# Patient Record
Sex: FEMALE | Race: BLACK OR AFRICAN AMERICAN | NOT HISPANIC OR LATINO | URBAN - METROPOLITAN AREA
[De-identification: names, ages, dates, MRNs, and addresses within clinical notes are randomized per-mention and may not be internally consistent; named-entity substitution may affect disease eponyms.]

---

## 2022-10-31 ENCOUNTER — EMERGENCY (EMERGENCY)
Facility: HOSPITAL | Age: 32
LOS: 1 days | Discharge: HOME | End: 2022-10-31
Attending: EMERGENCY MEDICINE
Payer: SELF-PAY

## 2022-10-31 VITALS
WEIGHT: 293 LBS | DIASTOLIC BLOOD PRESSURE: 91 MMHG | OXYGEN SATURATION: 100 % | HEART RATE: 75 BPM | RESPIRATION RATE: 18 BRPM | SYSTOLIC BLOOD PRESSURE: 152 MMHG | TEMPERATURE: 98 F

## 2022-10-31 PROCEDURE — 99282 EMERGENCY DEPT VISIT SF MDM: CPT

## 2022-10-31 PROCEDURE — 99283 EMERGENCY DEPT VISIT LOW MDM: CPT

## 2022-10-31 RX ORDER — ACETAMINOPHEN 500 MG
650 TABLET ORAL ONCE
Refills: 0 | Status: COMPLETED | OUTPATIENT
Start: 2022-10-31 | End: 2022-10-31

## 2022-10-31 RX ADMIN — Medication 650 MILLIGRAM(S): at 22:50

## 2022-11-01 NOTE — ED PROVIDER NOTE - PATIENT PORTAL LINK FT
You can access the FollowMyHealth Patient Portal offered by [facility] by registering at the following website: [email or web address]. By joining [employer]’s FollowMyHealth portal, you will also be able to view your health information using other applications (apps) compatible with our system.

## 2022-11-01 NOTE — ED PROVIDER NOTE - PHYSICAL EXAMINATION
Mild soft tissue swelling to right forehead area  GCS 15, no raccoon eyes, no Battles sign, no scalp step off deformities.   No cervical, thoracic or lumbosacral midline bony deformities,  +rotation and flexion-extension of neck and truncal area intact.

## 2022-11-01 NOTE — ED PROVIDER NOTE - CLINICAL SUMMARY MEDICAL DECISION MAKING FREE TEXT BOX
12:35a- Pt is well appearing, no distress, normal gait in ED. 12:35a- Pt is well appearing, no distress, normal gait in ED. Cold compress provided for pt.  Pt is well appearing, has no new complaints and able to walk with normal gait. Pt is stable for discharge and follow up with medical doctor. Pt educated on care and need for follow up. Discussed anticipatory guidance and return precautions. Questions answered. I had a detailed discussion with the patient and/or guardian regarding the historical points, exam findings, and any diagnostic results supporting the discharge diagnosis.

## 2022-11-01 NOTE — ED PROVIDER NOTE - NSFOLLOWUPINSTRUCTIONS_ED_ALL_ED_FT
There are many types of head injuries. Head injuries can be as minor as a small bump, or they can be a serious medical issue. More severe head injuries include:  •A jarring injury to the brain (concussion).      •A bruise (contusion) of the brain. This means there is bleeding in the brain that can cause swelling.      •A cracked skull (skull fracture).      •Bleeding in the brain that collects, clots, and forms a bump (hematoma).      After a head injury, most problems occur within the first 24 hours, but side effects may occur up to 7–10 days after the injury. It is important to watch your condition for any changes. You may need to be observed in the emergency department or urgent care, or you may be admitted to the hospital.      What are the causes?    There are many possible causes of a head injury. Serious head injuries may be caused by car accidents, bicycle or motorcycle accidents, sports injuries, falls, or being struck by an object.      What are the symptoms?    Symptoms of a head injury include a contusion, bump, or bleeding at the site of the injury. Other physical symptoms may include:  •Headache.      •Nausea or vomiting.      •Dizziness.      •Blurred or double vision.      •Being uncomfortable around bright lights or loud noises.      •Seizures.      •Feeling tired.      •Trouble being awakened.      •Loss of consciousness.      Mental or emotional symptoms may include:  •Irritability.      •Confusion and memory problems.      •Poor attention and concentration.      •Changes in eating or sleeping habits.      •Anxiety or depression.        How is this diagnosed?    This condition can usually be diagnosed based on your symptoms, a description of the injury, and a physical exam. You may also have imaging tests done, such as a CT scan or an MRI.      How is this treated?    Treatment for this condition depends on the severity and type of injury you have. The main goal of treatment is to prevent complications and allow the brain time to heal.    Mild head injury     If you have a mild head injury, you may be sent home, and treatment may include:  •Observation. A responsible adult should stay with you for 24 hours after your injury and check on you often.      •Physical rest.      •Brain rest.      •Pain medicines.      Severe head injury    If you have a severe head injury, treatment may include:•Close observation. This includes hospitalization with the following care:  •Frequent physical exams.      •Frequent checks of how your brain and nervous system are working (neurological status).      •Checking your blood pressure and oxygen levels.        •Medicines to relieve pain, prevent seizures, and decrease brain swelling.      •Airway protection and breathing support. This may include using a ventilator.      •Treatments that monitor and manage swelling inside the brain.    •Brain surgery. This may be needed to:  •Remove a collection of blood or blood clots.      •Stop the bleeding.      •Remove a part of the skull to allow room for the brain to swell.          Follow these instructions at home:    Activity     •Rest and avoid activities that are physically hard or tiring.      •Make sure you get enough sleep.    •Let your brain rest by limiting activities that require a lot of thought or attention, such as:  •Watching TV.      •Playing memory games and puzzles.      •Job-related work or homework.      •Working on the computer, using social media, and texting.        •Avoid activities that could cause another head injury, such as playing sports, until your health care provider approves. Having another head injury, especially before the first one has healed, can be dangerous.      •Ask your health care provider when it is safe for you to return to your regular activities, including work or school. Ask your health care provider for a step-by-step plan for gradually returning to activities.      •Ask your health care provider when you can drive, ride a bicycle, or use heavy machinery. Your ability to react may be slower after a brain injury. Do not do these activities if you are dizzy.        Lifestyle      • Do not drink alcohol until your health care provider approves. Do not use drugs. Alcohol and certain drugs may slow your recovery and can put you at risk of further injury.      •If it is harder than usual to remember things, write them down.      •If you are easily distracted, try to do one thing at a time.      •Talk with family members or close friends when making important decisions.      •Tell your friends, family, a trusted colleague, and  about your injury, symptoms, and restrictions. Have them watch for any new or worsening problems.      General instructions     •Take over-the-counter and prescription medicines only as told by your health care provider.      •Have someone stay with you for 24 hours after your head injury. This person should watch you for any changes in your symptoms and be ready to seek medical help.      •Keep all follow-up visits as told by your health care provider. This is important.        How is this prevented?    •Work on improving your balance and strength to avoid falls.      •Wear a seat belt when you are in a moving vehicle.      •Wear a helmet when riding a bicycle, skiing, or doing any other sport or activity that has a risk of injury.    •If you drink alcohol:•Limit how much you use to:  •0–1 drink a day for nonpregnant women.      •0–2 drinks a day for men.        •Be aware of how much alcohol is in your drink. In the [place] one drink equals one 12 oz bottle of beer (355 mL), one 5 oz glass of wine (148 mL), or one 1½ oz glass of hard liquor (44 mL).      •Take safety measures in your home, such as:  •Removing clutter and tripping hazards from floors and stairways.      •Using grab bars in bathrooms and handrails by stairs.      •Placing non-slip mats on floors and in bathtubs.      •Improving lighting in dim areas.          Where to find more information    •Centers for Disease Control and Prevention: www.cdc.gov        Get help right away if:  •You have:  •A severe headache that is not helped by medicine.      •Trouble walking or weakness in your arms and legs.      •Clear or bloody fluid coming from your nose or ears.      •Changes in your vision.      •A seizure.      •Increased confusion or irritability.        •Your symptoms get worse.      •You are sleepier than normal and have trouble staying awake.      •You lose your balance.      •Your pupils change size.      •Your speech is slurred.      •Your dizziness gets worse.      •You vomit.      These symptoms may represent a serious problem that is an emergency. Do not wait to see if the symptoms will go away. Get medical help right away. Call your local emergency services (911 in the [place] Do not drive yourself to the hospital.       Summary    •Head injuries can be minor, or they can be a serious medical issue requiring immediate attention.      •Treatment for this condition depends on the severity and type of injury you have.      •Have someone stay with you for 24 hours after your injury and check on you often.      •Ask your health care provider when it is safe for you to return to your regular activities, including work or school.      •Head injury prevention includes wearing a seat belt in a motor vehicle, using a helmet on a bicycle, limiting alcohol use, and taking safety measures in your home.      This information is not intended to replace advice given to you by your health care provider. Make sure you discuss any questions you have with your health care provider.

## 2022-11-01 NOTE — ED PROVIDER NOTE - OBJECTIVE STATEMENT
32-year-old female patient states 1 hour prior to arrival she was in bathroom and turned around and accidentally struck right side of forehead against open door.  No LOC reported, no neck pain, no nausea no vomiting.  On evaluation patient is well-appearing and has no signs of traumatic brain injury.

## 2022-11-01 NOTE — ED PROVIDER NOTE - NSFOLLOWUPCLINICS_GEN_ALL_ED_FT
[facility]  Internal Medicine  [address] [place] [address]  Phone: [phone]  Fax: [phone]

## 2022-12-16 NOTE — ED ADULT TRIAGE NOTE - WEIGHT IN LBS
Condition:: Subcutaneous nodule located on the back Please Describe Your Condition:: Pediatrician diagnosed otc with subcutaneous nodule. Nodule has been onset for 14 months. No medication or rx were applied on the area. 045

## 2023-01-21 ENCOUNTER — EMERGENCY (EMERGENCY)
Facility: HOSPITAL | Age: 33
LOS: 1 days | Discharge: HOME | End: 2023-01-21
Attending: EMERGENCY MEDICINE
Payer: SELF-PAY

## 2023-01-21 VITALS
SYSTOLIC BLOOD PRESSURE: 132 MMHG | TEMPERATURE: 98 F | RESPIRATION RATE: 18 BRPM | HEIGHT: 62 IN | WEIGHT: 287.04 LBS | HEART RATE: 76 BPM | DIASTOLIC BLOOD PRESSURE: 94 MMHG | OXYGEN SATURATION: 99 %

## 2023-01-21 PROCEDURE — 99284 EMERGENCY DEPT VISIT MOD MDM: CPT

## 2023-01-21 PROCEDURE — 99053 MED SERV 10PM-8AM 24 HR FAC: CPT

## 2023-01-21 RX ORDER — DIPHENHYDRAMINE HCL 50 MG
50 CAPSULE ORAL ONCE
Refills: 0 | Status: COMPLETED | OUTPATIENT
Start: 2023-01-21 | End: 2023-01-21

## 2023-01-21 NOTE — ED PROVIDER NOTE - OBJECTIVE STATEMENT
32-year-old woman presenting complaining of feeling generalized skin itching and puffiness beginning earlier this evening.  She reports that she thinks it stemming from eating a can of tuna yesterday for lunch but symptoms did not present until earlier this evening.  Otherwise she does not have any new exposures that she is aware of.  She did not take anything at home for an allergic reaction prior to coming to the ER.  She denies any facial swelling, lip swelling, tongue swelling, difficulty breathing, chest pain, nausea or vomiting.

## 2023-01-21 NOTE — ED PROVIDER NOTE - NSFOLLOWUPINSTRUCTIONS_ED_ALL_ED_FT
Thank you for choosing [facility] for your health care.    You were seen in the ER for possible allergic reaction.  There was no evidence of a severe or life-threatening reaction at this time.  You were treated with Benadryl for the itchiness which improved your symptoms.  You can continue taking Benadryl over-the-counter if the symptoms recur.  Please return to the emergency room if you feel like your throat is closing, if you cannot breathe or for any other concerning symptoms.

## 2023-01-21 NOTE — ED ADULT TRIAGE NOTE - CCCP TRG CHIEF CMPLNT
c/o allergic reaction after eating tuna yesterday at 1 pm /itchy skin /swollen face/allergic reaction

## 2023-01-21 NOTE — ED PROVIDER NOTE - PHYSICAL EXAMINATION
Exam:  General: Patient well appearing, vital signs within normal limits  HEENT: airway patent with moist mucous membranes, no uvular edema, no lip or tongue edema  Cardiac: RRR S1/S2 with strong peripheral pulses  Respiratory: lungs clear without respiratory distress  GI: abdomen soft, non tender, non distended  Neuro: no gross neurologic deficits  Skin: warm, well perfused, no urticaria  Psych: normal mood and affect

## 2023-01-21 NOTE — ED PROVIDER NOTE - PATIENT PORTAL LINK FT
You can access the FollowMyHealth Patient Portal offered by [facility] by registering at the following website: [email or web address]. By joining [employer]’s FollowMyHealth portal, you will also be able to view your health information using other applications (apps) compatible with our system.

## 2023-01-21 NOTE — ED PROVIDER NOTE - CLINICAL SUMMARY MEDICAL DECISION MAKING FREE TEXT BOX
Patient presenting with nonspecific pruritus without evidence of anaphylaxis or severe allergic reaction.  Will treat with Benadryl and reevaluate.  Anticipate patient can be discharged home unless significant progression of symptoms while in the ER.

## 2023-01-22 PROBLEM — Z78.9 OTHER SPECIFIED HEALTH STATUS: Chronic | Status: ACTIVE | Noted: 2022-11-01

## 2023-01-22 PROCEDURE — 99282 EMERGENCY DEPT VISIT SF MDM: CPT

## 2023-01-22 RX ADMIN — Medication 50 MILLIGRAM(S): at 00:29

## 2023-01-22 NOTE — ED ADULT NURSE NOTE - OBJECTIVE STATEMENT
Patient came to ED with c/o itchiness and facial swollen after she ate a can of tuna. Patient denies shortness/difficulty of breathing.

## 2023-02-10 ENCOUNTER — EMERGENCY (EMERGENCY)
Facility: HOSPITAL | Age: 33
LOS: 1 days | Discharge: HOME | End: 2023-02-10
Attending: EMERGENCY MEDICINE
Payer: SELF-PAY

## 2023-02-10 VITALS
HEART RATE: 100 BPM | HEIGHT: 62 IN | TEMPERATURE: 98 F | RESPIRATION RATE: 16 BRPM | DIASTOLIC BLOOD PRESSURE: 102 MMHG | WEIGHT: 287.92 LBS | OXYGEN SATURATION: 96 % | SYSTOLIC BLOOD PRESSURE: 150 MMHG

## 2023-02-10 VITALS
RESPIRATION RATE: 16 BRPM | HEART RATE: 74 BPM | OXYGEN SATURATION: 100 % | DIASTOLIC BLOOD PRESSURE: 89 MMHG | SYSTOLIC BLOOD PRESSURE: 129 MMHG | TEMPERATURE: 98 F

## 2023-02-10 PROCEDURE — 99283 EMERGENCY DEPT VISIT LOW MDM: CPT

## 2023-02-10 PROCEDURE — 99284 EMERGENCY DEPT VISIT MOD MDM: CPT

## 2023-02-10 PROCEDURE — 99053 MED SERV 10PM-8AM 24 HR FAC: CPT

## 2023-02-10 RX ORDER — FAMOTIDINE 10 MG/ML
40 INJECTION INTRAVENOUS ONCE
Refills: 0 | Status: COMPLETED | OUTPATIENT
Start: 2023-02-10 | End: 2023-02-10

## 2023-02-10 RX ORDER — DIPHENHYDRAMINE HCL 50 MG
25 CAPSULE ORAL ONCE
Refills: 0 | Status: COMPLETED | OUTPATIENT
Start: 2023-02-10 | End: 2023-02-10

## 2023-02-10 RX ADMIN — Medication 40 MILLIGRAM(S): at 22:59

## 2023-02-10 RX ADMIN — Medication 25 MILLIGRAM(S): at 22:59

## 2023-02-10 RX ADMIN — FAMOTIDINE 40 MILLIGRAM(S): 10 INJECTION INTRAVENOUS at 22:58

## 2023-02-10 NOTE — ED PROVIDER NOTE - PROGRESS NOTE DETAILS
feels improved. rash resolved. will dc. rx for prednisone. f/u with allergist. return precautions discussed.

## 2023-02-10 NOTE — ED PROVIDER NOTE - PATIENT PORTAL LINK FT
You can access the FollowMyHealth Patient Portal offered by [facility] by registering at the following website: [email or web address]. By joining [email or web address]’s FollowMyHealth portal, you will also be able to view your health information using other applications (apps) compatible with our system.

## 2023-02-10 NOTE — ED PROVIDER NOTE - NSFOLLOWUPINSTRUCTIONS_ED_ALL_ED_FT
[facility]al[facility]                                                                                                                                                                 Allergies, Adult      An allergy is a condition in which the body's defense system (immune system) comes in contact with an allergen and reacts to it. An allergen is anything that causes an allergic reaction. Allergens cause the immune system to make proteins for fighting infections (antibodies). These antibodies cause cells to release chemicals called histamines that set off the symptoms of an allergic reaction.    Allergies often affect the nasal passages (allergic rhinitis), eyes (allergic conjunctivitis), skin (atopic dermatitis), and stomach. Allergies can be mild, moderate, or severe. They cannot spread from person to person. Allergies can develop at any age and may be outgrown.      What are the causes?    This condition is caused by allergens. Common allergens include:  •Outdoor allergens, such as pollen, car fumes, and mold.      •Indoor allergens, such as dust, smoke, mold, and pet dander.      •Other allergens, such as foods, medicines, scents, insect bites or stings, and other skin irritants.        What increases the risk?    You are more likely to develop this condition if you have:  •Family members with allergies.      •Family members who have any condition that may be caused by allergens, such as asthma. This may make you more likely to have other allergies.        What are the signs or symptoms?    Symptoms of this condition depend on the severity of the allergy.    Mild to moderate symptoms     •Runny nose, stuffy nose (nasal congestion), or sneezing.      •Itchy mouth, ears, or throat.      •A feeling of mucus dripping down the back of your throat (postnasal drip).      •Sore throat.      •Itchy, red, watery, or puffy eyes.      •Skin rash, or itchy, red, swollen areas of skin (hives).      •Stomach cramps or bloating.      Severe symptoms     Severe allergies to food, medicine, or insect bites may cause anaphylaxis, which can be life-threatening. Symptoms include:  •A red (flushed) face.      •Wheezing or coughing.      •Swollen lips, tongue, or mouth.      •Tight or swollen throat.      •Chest pain or tightness, or rapid heartbeat.      •Trouble breathing or shortness of breath.      •Pain in the abdomen, vomiting, or diarrhea.      •Dizziness or fainting.        How is this diagnosed?    This condition is diagnosed based on your symptoms, your family and medical history, and a physical exam. You may also have tests, including:•Skin tests to see how your skin reacts to allergens that may be causing your symptoms. Tests include:  •Skin prick test. For this test, an allergen is introduced to your body through a small opening in the skin.      •Intradermal skin test. For this test, a small amount of allergen is injected under the first layer of your skin.      •Patch test. For this test, a small amount of allergen is placed on your skin. The area is covered and then checked after a few days.        •Blood tests.      •A challenge test. For this test, you will eat or breathe in a small amount of allergen to see if you have an allergic reaction.      You may also be asked to:  •Keep a food diary. This is a record of all the foods, drinks, and symptoms you have in a day.    •Try an elimination diet. To do this:  •Remove certain foods from your diet.      •Add those foods back one by one to find out if any foods cause an allergic reaction.          How is this treated?                  Treatment for allergies depends on your symptoms. Treatment may include:  •Cold, wet cloths (cold compresses) to soothe itching and swelling.      •Eye drops or nasal sprays.      •Nasal irrigation to help clear your mucus or keep the nasal passages moist.      •A humidifier to add moisture to the air.      •Skin creams to treat rashes or itching.      •Oral antihistamines or other medicines to block the reaction or to treat inflammation.      •Diet changes to remove foods that cause allergies.    •Being exposed again and again to tiny amounts of allergens to help you build a defense against it (tolerance). This is called immunotherapy. Examples include:  •Allergy shot. You receive an injection that contains an allergen.      •Sublingual immunotherapy. You take a small dose of allergen under your tongue.        •Emergency injection for anaphylaxis. You give yourself a shot using a syringe (auto-injector) that contains the amount of medicine you need. Your health care provider will teach you how to give yourself an injection.        Follow these instructions at home:      Medicines      •Take or apply over-the-counter and prescription medicines only as told by your health care provider.      •Always carry your auto-injector pen if you are at risk of anaphylaxis. Give yourself an injection as told by your health care provider.      Eating and drinking     •Follow instructions from your health care provider about eating or drinking restrictions.      •Drink enough fluid to keep your urine pale yellow.      General instructions     •Wear a medical alert bracelet or necklace to let others know that you have had anaphylaxis before.      •Avoid known allergens whenever possible.      •Keep all follow-up visits as told by your health care provider. This is important.        Contact a health care provider if:    •Your symptoms do not get better with treatment.        Get help right away if:  •You have symptoms of anaphylaxis. These include:  •Swollen mouth, tongue, or throat.      •Pain or tightness in your chest.      •Trouble breathing or shortness of breath.      •Dizziness or fainting.      •Severe abdominal pain, vomiting, or diarrhea.        These symptoms may represent a serious problem that is an emergency. Do not wait to see if the symptoms will go away. Get medical help right away. Call your local emergency services (911 in the [place] Do not drive yourself to the hospital.       Summary    •Take or apply over-the-counter and prescription medicines only as told by your health care provider.      •Avoid known allergens when possible.      •Always carry your auto-injector pen if you are at risk of anaphylaxis. Give yourself an injection as told by your health care provider.      •Wear a medical alert bracelet or necklace to let others know that you have had anaphylaxis before.      •Anaphylaxis is a life-threatening emergency. Get help right away.      This information is not intended to replace advice given to you by your health care provider. Make sure you discuss any questions you have with your health care provider.      Document Revised: 08/16/2021 Document Reviewed: 10/28/2020    Elsevier Patient Education © 2022 [employer]

## 2023-02-10 NOTE — ED PROVIDER NOTE - OBJECTIVE STATEMENT
32 year old female denies PMH coming in with an allergic reaction. states started to have generalized hives. had taken benadryl throughout the day but still with hives. states 3 weeks ago had a similar but less intense reaction and thought it was from tuna in oil but today hasn't had anything out of the ordinary. denies facial swelling, difficulty breathing, and all other complaints.

## 2023-02-10 NOTE — ED PROVIDER NOTE - NSFOLLOWUPCLINICS_GEN_ALL_ED_FT
[facility]  Allergy  [address] [place] [address]  Phone: [phone]  Fax:

## 2023-02-10 NOTE — ED PROVIDER NOTE - PHYSICAL EXAMINATION
urticaria around b/l UE/LE/trunk  no facial swelling. speaking in full sentences. tolerating secretions without issue.

## 2023-02-11 ENCOUNTER — EMERGENCY (EMERGENCY)
Facility: HOSPITAL | Age: 33
LOS: 1 days | Discharge: HOME | End: 2023-02-11
Attending: EMERGENCY MEDICINE
Payer: SELF-PAY

## 2023-02-11 VITALS
HEART RATE: 78 BPM | DIASTOLIC BLOOD PRESSURE: 76 MMHG | SYSTOLIC BLOOD PRESSURE: 147 MMHG | RESPIRATION RATE: 18 BRPM | OXYGEN SATURATION: 100 % | TEMPERATURE: 98 F

## 2023-02-11 VITALS
RESPIRATION RATE: 16 BRPM | HEART RATE: 87 BPM | HEIGHT: 62 IN | OXYGEN SATURATION: 100 % | TEMPERATURE: 98 F | SYSTOLIC BLOOD PRESSURE: 153 MMHG | DIASTOLIC BLOOD PRESSURE: 78 MMHG | WEIGHT: 287.92 LBS

## 2023-02-11 LAB
ALBUMIN SERPL ELPH-MCNC: 3.6 G/DL — SIGNIFICANT CHANGE UP (ref 3.5–5)
ALP SERPL-CCNC: 90 U/L — SIGNIFICANT CHANGE UP (ref 40–120)
ALT FLD-CCNC: 30 U/L DA — SIGNIFICANT CHANGE UP (ref 10–60)
ANION GAP SERPL CALC-SCNC: 5 MMOL/L — SIGNIFICANT CHANGE UP (ref 5–17)
AST SERPL-CCNC: 57 U/L — HIGH (ref 10–40)
BASOPHILS # BLD AUTO: 0.01 K/UL — SIGNIFICANT CHANGE UP (ref 0–0.2)
BASOPHILS NFR BLD AUTO: 0.1 % — SIGNIFICANT CHANGE UP (ref 0–2)
BILIRUB SERPL-MCNC: 0.5 MG/DL — SIGNIFICANT CHANGE UP (ref 0.2–1.2)
BUN SERPL-MCNC: 10 MG/DL — SIGNIFICANT CHANGE UP (ref 7–18)
CALCIUM SERPL-MCNC: 9.7 MG/DL — SIGNIFICANT CHANGE UP (ref 8.4–10.5)
CHLORIDE SERPL-SCNC: 103 MMOL/L — SIGNIFICANT CHANGE UP (ref 96–108)
CO2 SERPL-SCNC: 24 MMOL/L — SIGNIFICANT CHANGE UP (ref 22–31)
CREAT SERPL-MCNC: 0.92 MG/DL — SIGNIFICANT CHANGE UP (ref 0.5–1.3)
EGFR: 85 ML/MIN/1.73M2 — SIGNIFICANT CHANGE UP
EOSINOPHIL # BLD AUTO: 0 K/UL — SIGNIFICANT CHANGE UP (ref 0–0.5)
EOSINOPHIL NFR BLD AUTO: 0 % — SIGNIFICANT CHANGE UP (ref 0–6)
ERYTHROCYTE [SEDIMENTATION RATE] IN BLOOD: 9 MM/HR — SIGNIFICANT CHANGE UP (ref 0–15)
GLUCOSE SERPL-MCNC: 101 MG/DL — HIGH (ref 70–99)
HCG SERPL-ACNC: <1 MIU/ML — SIGNIFICANT CHANGE UP
HCT VFR BLD CALC: 44.5 % — SIGNIFICANT CHANGE UP (ref 34.5–45)
HGB BLD-MCNC: 13.4 G/DL — SIGNIFICANT CHANGE UP (ref 11.5–15.5)
IMM GRANULOCYTES NFR BLD AUTO: 0.3 % — SIGNIFICANT CHANGE UP (ref 0–0.9)
LYMPHOCYTES # BLD AUTO: 1.09 K/UL — SIGNIFICANT CHANGE UP (ref 1–3.3)
LYMPHOCYTES # BLD AUTO: 11.7 % — LOW (ref 13–44)
MCHC RBC-ENTMCNC: 24.6 PG — LOW (ref 27–34)
MCHC RBC-ENTMCNC: 30.1 GM/DL — LOW (ref 32–36)
MCV RBC AUTO: 81.8 FL — SIGNIFICANT CHANGE UP (ref 80–100)
MONOCYTES # BLD AUTO: 0.14 K/UL — SIGNIFICANT CHANGE UP (ref 0–0.9)
MONOCYTES NFR BLD AUTO: 1.5 % — LOW (ref 2–14)
NEUTROPHILS # BLD AUTO: 8.04 K/UL — HIGH (ref 1.8–7.4)
NEUTROPHILS NFR BLD AUTO: 86.4 % — HIGH (ref 43–77)
NRBC # BLD: 0 /100 WBCS — SIGNIFICANT CHANGE UP (ref 0–0)
PLATELET # BLD AUTO: 405 K/UL — HIGH (ref 150–400)
POTASSIUM SERPL-MCNC: 5.4 MMOL/L — HIGH (ref 3.5–5.3)
POTASSIUM SERPL-SCNC: 5.4 MMOL/L — HIGH (ref 3.5–5.3)
PROT SERPL-MCNC: 8.6 G/DL — HIGH (ref 6–8.3)
RBC # BLD: 5.44 M/UL — HIGH (ref 3.8–5.2)
RBC # FLD: 14.4 % — SIGNIFICANT CHANGE UP (ref 10.3–14.5)
SODIUM SERPL-SCNC: 132 MMOL/L — LOW (ref 135–145)
WBC # BLD: 9.31 K/UL — SIGNIFICANT CHANGE UP (ref 3.8–10.5)
WBC # FLD AUTO: 9.31 K/UL — SIGNIFICANT CHANGE UP (ref 3.8–10.5)

## 2023-02-11 PROCEDURE — 85652 RBC SED RATE AUTOMATED: CPT

## 2023-02-11 PROCEDURE — 99284 EMERGENCY DEPT VISIT MOD MDM: CPT | Mod: 25

## 2023-02-11 PROCEDURE — 84702 CHORIONIC GONADOTROPIN TEST: CPT

## 2023-02-11 PROCEDURE — 36415 COLL VENOUS BLD VENIPUNCTURE: CPT

## 2023-02-11 PROCEDURE — 96374 THER/PROPH/DIAG INJ IV PUSH: CPT

## 2023-02-11 PROCEDURE — 96375 TX/PRO/DX INJ NEW DRUG ADDON: CPT

## 2023-02-11 PROCEDURE — 85025 COMPLETE CBC W/AUTO DIFF WBC: CPT

## 2023-02-11 PROCEDURE — 99284 EMERGENCY DEPT VISIT MOD MDM: CPT

## 2023-02-11 PROCEDURE — 80053 COMPREHEN METABOLIC PANEL: CPT

## 2023-02-11 RX ORDER — FAMOTIDINE 10 MG/ML
1 INJECTION INTRAVENOUS
Qty: 30 | Refills: 0
Start: 2023-02-11 | End: 2023-02-25

## 2023-02-11 RX ORDER — DIPHENHYDRAMINE HCL 50 MG
50 CAPSULE ORAL ONCE
Refills: 0 | Status: COMPLETED | OUTPATIENT
Start: 2023-02-11 | End: 2023-02-11

## 2023-02-11 RX ORDER — MONTELUKAST 4 MG/1
1 TABLET, CHEWABLE ORAL
Qty: 15 | Refills: 0
Start: 2023-02-11 | End: 2023-02-25

## 2023-02-11 RX ORDER — MONTELUKAST 4 MG/1
10 TABLET, CHEWABLE ORAL ONCE
Refills: 0 | Status: COMPLETED | OUTPATIENT
Start: 2023-02-11 | End: 2023-02-11

## 2023-02-11 RX ORDER — FAMOTIDINE 10 MG/ML
20 INJECTION INTRAVENOUS ONCE
Refills: 0 | Status: COMPLETED | OUTPATIENT
Start: 2023-02-11 | End: 2023-02-11

## 2023-02-11 RX ADMIN — Medication 50 MILLIGRAM(S): at 14:50

## 2023-02-11 RX ADMIN — MONTELUKAST 10 MILLIGRAM(S): 4 TABLET, CHEWABLE ORAL at 14:49

## 2023-02-11 RX ADMIN — Medication 125 MILLIGRAM(S): at 14:51

## 2023-02-11 RX ADMIN — FAMOTIDINE 20 MILLIGRAM(S): 10 INJECTION INTRAVENOUS at 14:50

## 2023-02-11 NOTE — ED ADULT NURSE NOTE - OBJECTIVE STATEMENT
Pt states she was seen in ER ystalexandro and was given prednisone, pepcid and benadryl. States after taking prednisone this morning she got hives on her face and has generalized itching

## 2023-02-11 NOTE — ED PROVIDER NOTE - OBJECTIVE STATEMENT
32 y.o. female LMP 1/27, pt claims was seen in ED for hives, ? allergic to cherries. Pt was given Prednisone, Pepcid & Benadryl, improved & d/c home.  Pt woke up this am with hives on her wrists, knees.  Pt took Prednisone, 20 mins noted with hives all over body, facial swelling, no tongue swelling, throat tightness, SOB.  Pt took nothing

## 2023-02-11 NOTE — ED ADULT TRIAGE NOTE - CHIEF COMPLAINT QUOTE
rash and swelling for 2 days ago, improved after meds yesterday, facial swelling started today after taking medication. denies any difficulty breathing.

## 2023-02-11 NOTE — ED PROVIDER NOTE - NSFOLLOWUPINSTRUCTIONS_ED_ALL_ED_FT
Hives      Hives are itchy, red, swollen areas on your skin. Hives can show up on any part of your body. Hives often fade within 24 hours (acute hives). New hives can show up after old ones fade. This can go on for many days or weeks (chronic hives). Hives do not spread from person to person (are not contagious).    Hives are caused by your body's response to something that you are allergic to (allergen). These are sometimes called triggers. You can get hives right after being around a trigger, or hours later.      What are the causes?    •Allergies to foods.      •Insect bites or stings.      •Exposure to pollen or pets.      •Spending time in sunlight, heat, or cold.      •Exercise.      •Stress.      You can also get hives from other medical conditions and treatments, such as:  •Some medicines.      •Chemicals or latex.      •Viruses. This includes the common cold.      •Infections caused by germs (bacteria).      •Allergy shots.      •Blood transfusions.      Sometimes, the cause is not known.      What increases the risk?    •Being a woman.    •Being allergic to foods such as:  •Citrus fruits.      •Milk.      •Eggs.      •Peanuts.      •Tree nuts.      •Shellfish.      •Being allergic to:  •Medicines.      •Latex.      •Insects.      •Animals.      •Pollen.          What are the signs or symptoms?  A red rash on a person's upper arm. •Raised, itchy, red or white bumps or patches on your skin. These areas may:  •Get large and swollen.      •Change in shape and location.      •Stand alone or connect to each other over a large area of skin.      •Sting or hurt.      •Turn white when pressed in the center (william).        In very bad cases, your hands, feet, and face may also get swollen. This may happen if hives start deeper in your skin.      How is this treated?    Treatment for this condition depends on your symptoms. Treatment may include:  •Using cool, wet cloths (cool compresses) or taking cool showers to stop the itching.    •Medicines that help:  •Relieve itching (antihistamines).      •Reduce swelling (corticosteroids).      •Treat infection (antibiotics).        •A medicine (omalizumab) that is given as a shot (injection). Your doctor may prescribe this if you have hives that do not get better even after other treatments.      •In very bad cases, you may need a shot of a medicine called epinephrine to prevent a life-threatening allergic reaction (anaphylaxis).        Follow these instructions at home:    Medicines     •Take or apply over-the-counter and prescription medicines only as told by your doctor.      •If you were prescribed an antibiotic medicine, use it as told by your doctor. Do not stop using it even if you start to feel better.      Skin care     •Apply cool, wet cloths to the hives.      • Do not scratch your skin. Do not rub your skin.      General instructions     • Do not take hot showers or baths. This can make itching worse.      • Do not wear tight clothes.      •Use sunscreen and wear clothes that cover your skin when you are outside.    •Avoid any triggers that cause your hives. Keep a journal to help track what causes your hives. Write down:  •What medicines you take.      •What you eat and drink.      •What products you use on your skin.        •Keep all follow-up visits as told by your doctor. This is important.        Contact a doctor if:    •Your symptoms are not better with medicine.      •Your joints hurt or are swollen.        Get help right away if:    •You have a fever.      •You have pain in your belly (abdomen).      •Your tongue or lips are swollen.      •Your eyelids are swollen.      •Your chest or throat feels tight.      •You have trouble breathing or swallowing.      These symptoms may be an emergency. Do not wait to see if the symptoms will go away. Get medical help right away. Call your local emergency services (911 in the [place] Do not drive yourself to the hospital.       Summary    •Hives are itchy, red, swollen areas on your skin.      •Treatment for this condition depends on your symptoms.      •Avoid things that cause your hives. Keep a journal to help track what causes your hives.      •Take and apply over-the-counter and prescription medicines only as told by your doctor.      •Get help right away if your chest or throat feels tight or if you have trouble breathing or swallowing.      This information is not intended to replace advice given to you by your health care provider. Make sure you discuss any questions you have with your health care provider.      allergist outpatient referral

## 2023-02-11 NOTE — ED PROVIDER NOTE - PATIENT PORTAL LINK FT
You can access the FollowMyHealth Patient Portal offered by [facility] by registering at the following website: [email or web address]. By joining [facility]’s FollowMyHealth portal, you will also be able to view your health information using other applications (apps) compatible with our system.

## 2023-02-11 NOTE — ED ADULT NURSE NOTE - CHPI ED NUR DURATION
Assumed care of pt this am. Pt is A&Ox4. MD aware of pt's vitals this am,no orders at this time.RN will continue to monitor. Pt is on 2L  Oxygen via NC. Bed is locked and in the lowest position. Hourly rounding in place.    today

## 2023-02-11 NOTE — ED PROVIDER NOTE - CLINICAL SUMMARY MEDICAL DECISION MAKING FREE TEXT BOX
pt with urticaria, claims worse with Prednisone.  Pt received Prednisone yest with no reaction.  Unlikely with Prednisone allergy.  since pt returns with similar complaints, will get labs., give meds, also Solumedrol & reassess

## 2023-02-11 NOTE — ED PROVIDER NOTE - PROGRESS NOTE DETAILS
Labs explained to pt.  Pt's feeling much better, hives resolved, will d/c home.  Allergist outpt referral

## 2023-02-28 PROBLEM — Z00.00 ENCOUNTER FOR PREVENTIVE HEALTH EXAMINATION: Status: ACTIVE | Noted: 2023-02-28

## 2023-04-21 ENCOUNTER — APPOINTMENT (OUTPATIENT)
Dept: PEDIATRIC ALLERGY IMMUNOLOGY | Facility: CLINIC | Age: 33
End: 2023-04-21

## 2025-03-20 ENCOUNTER — EMERGENCY (EMERGENCY)
Facility: HOSPITAL | Age: 35
LOS: 1 days | Discharge: HOME | End: 2025-03-20
Attending: STUDENT IN AN ORGANIZED HEALTH CARE EDUCATION/TRAINING PROGRAM
Payer: COMMERCIAL

## 2025-03-20 VITALS
DIASTOLIC BLOOD PRESSURE: 85 MMHG | HEART RATE: 69 BPM | OXYGEN SATURATION: 100 % | TEMPERATURE: 98 F | SYSTOLIC BLOOD PRESSURE: 123 MMHG | RESPIRATION RATE: 18 BRPM

## 2025-03-20 VITALS
HEART RATE: 77 BPM | TEMPERATURE: 98 F | RESPIRATION RATE: 17 BRPM | SYSTOLIC BLOOD PRESSURE: 134 MMHG | DIASTOLIC BLOOD PRESSURE: 75 MMHG | OXYGEN SATURATION: 100 % | HEIGHT: 62 IN | WEIGHT: 289.91 LBS

## 2025-03-20 LAB
ALBUMIN SERPL ELPH-MCNC: 3.2 G/DL — LOW (ref 3.5–5)
ALP SERPL-CCNC: 89 U/L — SIGNIFICANT CHANGE UP (ref 40–120)
ALT FLD-CCNC: 18 U/L DA — SIGNIFICANT CHANGE UP (ref 10–60)
ANION GAP SERPL CALC-SCNC: 6 MMOL/L — SIGNIFICANT CHANGE UP (ref 5–17)
AST SERPL-CCNC: 11 U/L — SIGNIFICANT CHANGE UP (ref 10–40)
BASOPHILS # BLD AUTO: 0.02 K/UL — SIGNIFICANT CHANGE UP (ref 0–0.2)
BASOPHILS NFR BLD AUTO: 0.3 % — SIGNIFICANT CHANGE UP (ref 0–2)
BILIRUB SERPL-MCNC: 0.6 MG/DL — SIGNIFICANT CHANGE UP (ref 0.2–1.2)
BUN SERPL-MCNC: 12 MG/DL — SIGNIFICANT CHANGE UP (ref 7–18)
CALCIUM SERPL-MCNC: 9.6 MG/DL — SIGNIFICANT CHANGE UP (ref 8.4–10.5)
CHLORIDE SERPL-SCNC: 102 MMOL/L — SIGNIFICANT CHANGE UP (ref 96–108)
CO2 SERPL-SCNC: 26 MMOL/L — SIGNIFICANT CHANGE UP (ref 22–31)
CREAT SERPL-MCNC: 0.96 MG/DL — SIGNIFICANT CHANGE UP (ref 0.5–1.3)
EGFR: 80 ML/MIN/1.73M2 — SIGNIFICANT CHANGE UP
EGFR: 80 ML/MIN/1.73M2 — SIGNIFICANT CHANGE UP
EOSINOPHIL # BLD AUTO: 0.01 K/UL — SIGNIFICANT CHANGE UP (ref 0–0.5)
EOSINOPHIL NFR BLD AUTO: 0.1 % — SIGNIFICANT CHANGE UP (ref 0–6)
GLUCOSE SERPL-MCNC: 93 MG/DL — SIGNIFICANT CHANGE UP (ref 70–99)
HCG SERPL-ACNC: <1 MIU/ML — SIGNIFICANT CHANGE UP
HCT VFR BLD CALC: 40.2 % — SIGNIFICANT CHANGE UP (ref 34.5–45)
HGB BLD-MCNC: 12.3 G/DL — SIGNIFICANT CHANGE UP (ref 11.5–15.5)
IMM GRANULOCYTES NFR BLD AUTO: 0.1 % — SIGNIFICANT CHANGE UP (ref 0–0.9)
LACTATE SERPL-SCNC: 0.8 MMOL/L — SIGNIFICANT CHANGE UP (ref 0.7–2)
LIDOCAIN IGE QN: 53 U/L — SIGNIFICANT CHANGE UP (ref 13–75)
LYMPHOCYTES # BLD AUTO: 1.6 K/UL — SIGNIFICANT CHANGE UP (ref 1–3.3)
LYMPHOCYTES # BLD AUTO: 23.9 % — SIGNIFICANT CHANGE UP (ref 13–44)
MCHC RBC-ENTMCNC: 24.9 PG — LOW (ref 27–34)
MCHC RBC-ENTMCNC: 30.6 G/DL — LOW (ref 32–36)
MCV RBC AUTO: 81.5 FL — SIGNIFICANT CHANGE UP (ref 80–100)
MONOCYTES # BLD AUTO: 0.54 K/UL — SIGNIFICANT CHANGE UP (ref 0–0.9)
MONOCYTES NFR BLD AUTO: 8.1 % — SIGNIFICANT CHANGE UP (ref 2–14)
NEUTROPHILS # BLD AUTO: 4.52 K/UL — SIGNIFICANT CHANGE UP (ref 1.8–7.4)
NEUTROPHILS NFR BLD AUTO: 67.5 % — SIGNIFICANT CHANGE UP (ref 43–77)
NRBC BLD AUTO-RTO: 0 /100 WBCS — SIGNIFICANT CHANGE UP (ref 0–0)
PLATELET # BLD AUTO: 346 K/UL — SIGNIFICANT CHANGE UP (ref 150–400)
POTASSIUM SERPL-MCNC: 3.6 MMOL/L — SIGNIFICANT CHANGE UP (ref 3.5–5.3)
POTASSIUM SERPL-SCNC: 3.6 MMOL/L — SIGNIFICANT CHANGE UP (ref 3.5–5.3)
PROT SERPL-MCNC: 7.6 G/DL — SIGNIFICANT CHANGE UP (ref 6–8.3)
RBC # BLD: 4.93 M/UL — SIGNIFICANT CHANGE UP (ref 3.8–5.2)
RBC # FLD: 14.9 % — HIGH (ref 10.3–14.5)
SODIUM SERPL-SCNC: 134 MMOL/L — LOW (ref 135–145)
WBC # BLD: 6.7 K/UL — SIGNIFICANT CHANGE UP (ref 3.8–10.5)
WBC # FLD AUTO: 6.7 K/UL — SIGNIFICANT CHANGE UP (ref 3.8–10.5)

## 2025-03-20 PROCEDURE — 96374 THER/PROPH/DIAG INJ IV PUSH: CPT

## 2025-03-20 PROCEDURE — 83605 ASSAY OF LACTIC ACID: CPT

## 2025-03-20 PROCEDURE — 83690 ASSAY OF LIPASE: CPT

## 2025-03-20 PROCEDURE — 84702 CHORIONIC GONADOTROPIN TEST: CPT

## 2025-03-20 PROCEDURE — 36415 COLL VENOUS BLD VENIPUNCTURE: CPT

## 2025-03-20 PROCEDURE — 85025 COMPLETE CBC W/AUTO DIFF WBC: CPT

## 2025-03-20 PROCEDURE — 96361 HYDRATE IV INFUSION ADD-ON: CPT

## 2025-03-20 PROCEDURE — 80053 COMPREHEN METABOLIC PANEL: CPT

## 2025-03-20 PROCEDURE — 99284 EMERGENCY DEPT VISIT MOD MDM: CPT

## 2025-03-20 PROCEDURE — 96375 TX/PRO/DX INJ NEW DRUG ADDON: CPT

## 2025-03-20 PROCEDURE — 99284 EMERGENCY DEPT VISIT MOD MDM: CPT | Mod: 25

## 2025-03-20 RX ORDER — ONDANSETRON HCL/PF 4 MG/2 ML
4 VIAL (ML) INJECTION ONCE
Refills: 0 | Status: COMPLETED | OUTPATIENT
Start: 2025-03-20 | End: 2025-03-20

## 2025-03-20 RX ADMIN — Medication 1000 MILLILITER(S): at 13:17

## 2025-03-20 RX ADMIN — Medication 4 MILLIGRAM(S): at 12:16

## 2025-03-20 RX ADMIN — Medication 1000 MILLILITER(S): at 13:37

## 2025-03-20 RX ADMIN — Medication 1000 MILLILITER(S): at 12:15

## 2025-03-20 RX ADMIN — Medication 20 MILLIGRAM(S): at 12:16

## 2025-04-02 NOTE — ED ADULT TRIAGE NOTE - BP NONINVASIVE DIASTOLIC (MM HG)
Diagnosis:   1. MDS (myelodysplastic syndrome)  (CMD)    2. Vitamin D deficiency       Regimen: Vidaza D1-5, 8-9  Cycle/Day: C159D5    Dr. Gómez is ordering clinician today.    Vital Signs:  ONC OP Encounter Vitals  BP: 115/73  Heart Rate: 61  Resp: 16  Temp: 97.9 °F (36.6 °C)  Temp src: Temporal  SpO2: 95 %      Allergies:    ALLERGIES:   Allergen Reactions    Contrast Media HIVES     IV contrast    Iodine   (Environmental Or Med) HIVES    Versed Other (See Comments)     unkown        Medications:  The medication list was reviewed. No changes noted.     ECOG: ECOG Performance Status: 2    Distress Screening: Is this day one of cycle or a new regimen? No Distress screening reviewed from previous visit, no further interventions    Toxicity Assessment:   Adverse Events?  Adverse Events: No    Dermatology/Skin  Injection Site Reaction: Grade 1    Constitutional  Fatigue: Grade 1      Additional Nursing Assessment: In addition to above toxicity assessment, this RN assessed labs and current weight .       Prechemo Checklist  Chemo Consent Signed: Yes  Protocol Verified: Yes  Is Protocol Standard of Care or Research?: Standard of Care  Pre-Chemo Labs Reviewed?: Yes  Provider Notified of Abnormal Labs Not Meeting Treatment Conditions: N/A  Pregnancy Screening Performed (if indicated): Not applicable  All Treatment Conditions Met?: Yes  Provider approved to proceed with treatment?: Yes  BSA/weight in Orders Verified for Weight-Based Drugs?: Yes  Chemo Dose Calculations Verified: Yes  Second RN Verified Calculations: Rehan Barry RN      Pre-Treatment: Patient has valid pre-authorization, Premed orders, including hydration, are verified prior to administration, and I have reviewed the following with the patient: Name of chemo drug, duration and route of infusion, Infusion/Drug volume and dose, and reportable infusion-related symptoms.     Treatment: Refer to LDA and MAR for line assessment and medication  administration, Chemotherapy has not ; double checked & verified by two practitioners, Appearance and physical integrity of drugs meets standard of drug monograph; double checked & verified by two practitioners, and SubQ/IM Injection: See injection record for documentation    Post Treatment: Treatment tolerated well; no adverse reaction    Oral Chemotherapy: No.    Education: No new instructions needed    Next appointment scheduled: 4/3  Patient instructed to call the office with any questions or concerns.    Patient Discharged: patient discharged to home per self, ambulatory, with caregiver, to care facility   78

## 2025-06-05 ENCOUNTER — EMERGENCY (EMERGENCY)
Facility: HOSPITAL | Age: 35
LOS: 1 days | End: 2025-06-05
Attending: STUDENT IN AN ORGANIZED HEALTH CARE EDUCATION/TRAINING PROGRAM
Payer: COMMERCIAL

## 2025-06-05 VITALS
HEART RATE: 83 BPM | WEIGHT: 285.06 LBS | HEIGHT: 62 IN | TEMPERATURE: 98 F | SYSTOLIC BLOOD PRESSURE: 133 MMHG | OXYGEN SATURATION: 96 % | DIASTOLIC BLOOD PRESSURE: 91 MMHG | RESPIRATION RATE: 17 BRPM

## 2025-06-05 PROCEDURE — 99283 EMERGENCY DEPT VISIT LOW MDM: CPT

## 2025-06-05 PROCEDURE — 99282 EMERGENCY DEPT VISIT SF MDM: CPT

## 2025-06-05 RX ORDER — LEVOCETIRIZINE DIHYDROCHLORIDE 5 MG/1
1 TABLET ORAL
Qty: 30 | Refills: 0
Start: 2025-06-05 | End: 2025-07-04

## 2025-06-05 RX ORDER — FLUTICASONE PROPIONATE 50 UG/1
2 SPRAY, METERED NASAL
Qty: 1 | Refills: 0
Start: 2025-06-05 | End: 2025-07-04

## 2025-06-05 NOTE — ED ADULT NURSE NOTE - NSFALLOOBATTEMPT_ED_ALL_ED
See the printed discharge instructions.  Use prescription medications as directed.  You may wear right arm sling periodically for support.  Follow-up with orthopedic surgery (see referral to Dr. Novoa and partners).   No My signature below certifies that the above stated patient is homebound and upon completion of the Face-To-Face encounter, has the need for intermittent skilled nursing, physical therapy and/or speech or occupational therapy services in their home for their current diagnosis as outlined in their initial plan of care. These services will continue to be monitored by myself or another physician.

## 2025-06-05 NOTE — ED PROVIDER NOTE - NSFOLLOWUPCLINICS_GEN_ALL_ED_FT
[facility]  Allergy  [address] [place] [address]  Phone: [phone]  Fax:     [facility]  Allergy  [address], [address]  [place] [address]  Phone: [phone]  Fax: [phone]

## 2025-06-05 NOTE — ED PROVIDER NOTE - OBJECTIVE STATEMENT
34-year-old female with no past medical history presenting with frontal facial swelling that began on Monday.  Patient reports she took Benadryl thinking it was an allergic reaction but denies any improvement in her symptoms.  Patient denies any difficulty breathing, difficulty swallowing, ear pain, throat pain, abdominal pain, nausea, vomiting, or rash.

## 2025-06-05 NOTE — ED PROVIDER NOTE - PATIENT PORTAL LINK FT
You can access the FollowMyHealth Patient Portal offered by [facility] by registering at the following website: [email or web address]. By joining [facility]’s FollowMyHealth portal, you will also be able to view your health information using other applications (apps) compatible with our system.

## 2025-06-05 NOTE — ED PROVIDER NOTE - NSFOLLOWUPINSTRUCTIONS_ED_ALL_ED_FT
Follow-up with an allergist within 1 month.    Medications to help your symptoms sent to the pharmacy.  Take as directed.  Might take a few days for you to see some results.    If you develop difficulty breathing, difficulty swallowing, vomiting or any other concerning symptoms return to the emergency room.

## 2025-06-05 NOTE — ED PROVIDER NOTE - ATTENDING APP SHARED VISIT CONTRIBUTION OF CARE
Presentation consistent with seasonal allergies.    General: NAD.  HEENT: EOMI. Anicteric sclera. PERRLA. NCAT. No JVD.  Heart/Lungs: RRR, CTAB.  Chest/Back: No asymmetry. No CVAT.  Abd: Soft, NT,ND.  Neuro: No focal deficits.  Skin: No rashes. Normal color for race.  Psych: Calm.    I saw and evaluated the patient, and discussed the case with the NP. I agree with their findings and plan as documented in their note in the patient’s medical record.  Disposition: Discharge. Discussed strict return precautions with patient.

## 2025-06-05 NOTE — ED PROVIDER NOTE - CLINICAL SUMMARY MEDICAL DECISION MAKING FREE TEXT BOX
34-year-old female with no past medical history presenting with frontal facial swelling that began on Monday.  Patient reports she took Benadryl thinking it was an allergic reaction but denies any improvement in her symptoms.  Patient denies any difficulty breathing, difficulty swallowing, ear pain, throat pain, abdominal pain, nausea, vomiting, or rash.    Patient is well-appearing on exam.  No appreciable facial swelling noted.  Will switch patient to a daily allergy medication and fluticasone and have patient follow-up with allergy.

## 2025-06-05 NOTE — ED ADULT NURSE NOTE - NSFALLUNIVINTERV_ED_ALL_ED
Bed/Stretcher in lowest position, wheels locked, appropriate side rails in place/Call bell, personal items and telephone in reach/Instruct patient to call for assistance before getting out of bed/chair/stretcher/Non-slip footwear applied when patient is off stretcher/[place] to call system/Physically safe environment - no spills, clutter or unnecessary equipment/Purposeful proactive rounding/Room/bathroom lighting operational, light cord in reach